# Patient Record
Sex: FEMALE | URBAN - METROPOLITAN AREA
[De-identification: names, ages, dates, MRNs, and addresses within clinical notes are randomized per-mention and may not be internally consistent; named-entity substitution may affect disease eponyms.]

---

## 2018-04-13 ENCOUNTER — APPOINTMENT (RX ONLY)
Dept: URBAN - METROPOLITAN AREA CLINIC 20 | Facility: CLINIC | Age: 53
Setting detail: DERMATOLOGY
End: 2018-04-13

## 2018-04-13 DIAGNOSIS — Z41.9 ENCOUNTER FOR PROCEDURE FOR PURPOSES OTHER THAN REMEDYING HEALTH STATE, UNSPECIFIED: ICD-10-CM

## 2018-04-13 PROCEDURE — ? SCITON BBL

## 2018-04-13 ASSESSMENT — LOCATION DETAILED DESCRIPTION DERM
LOCATION DETAILED: RIGHT CHIN
LOCATION DETAILED: SUBMENTAL CHIN

## 2018-04-13 ASSESSMENT — LOCATION ZONE DERM: LOCATION ZONE: FACE

## 2018-04-13 ASSESSMENT — LOCATION SIMPLE DESCRIPTION DERM
LOCATION SIMPLE: SUBMENTAL CHIN
LOCATION SIMPLE: CHIN

## 2018-04-13 NOTE — PROCEDURE: SCITON BBL
Spot Size: Finesse Adapter Size: 15 x 15 mm square
Pulse Duration: 20
Topical Anesthesia?: 23% lidocaine, 7% tetracaine
Detail Level: Simple
Cooling (In C): 15
Repetition Rate (Hz): 10
Passes: 1
Preprocedure Text: The treatment areas were thoroughly cleaned. Clear ultrasound gel was applied to the treatment area. The area was treated with no immediate stacking of pulses.
Post Procedure Text: The patient tolerated the procedure well. Post care was reviewed with the patient.
Spot Size: Finesse Adapter Size: 15 x 45 mm (No Finesse Adapter)
Price (Use Numbers Only, No Special Characters Or $): 150.00
Post-Care Instructions: I reviewed with the patient in detail post-care instructions. Patient should stay away from the sun and wear sun protection until treated areas are fully healed.
Fluence (J/Cm2): 25
Pulse Duration Units: milliseconds
Cooling (In C): 13
Cooling ?: Yes
Length Of Topical Anesthesia Application (Optional): 15 minutes
Anesthesia Volume In Cc: 0
Consent: Written consent obtained, risks reviewed including but not limited to crusting, scabbing, blistering, scarring, darker or lighter pigmentary change, bruising, and/or incomplete response.

## 2018-05-11 ENCOUNTER — APPOINTMENT (RX ONLY)
Dept: URBAN - METROPOLITAN AREA CLINIC 36 | Facility: CLINIC | Age: 53
Setting detail: DERMATOLOGY
End: 2018-05-11

## 2018-05-11 DIAGNOSIS — Z41.9 ENCOUNTER FOR PROCEDURE FOR PURPOSES OTHER THAN REMEDYING HEALTH STATE, UNSPECIFIED: ICD-10-CM

## 2018-05-11 PROCEDURE — ? SCITON BBL

## 2018-05-11 ASSESSMENT — LOCATION SIMPLE DESCRIPTION DERM
LOCATION SIMPLE: SUBMENTAL CHIN
LOCATION SIMPLE: CHIN

## 2018-05-11 ASSESSMENT — LOCATION DETAILED DESCRIPTION DERM
LOCATION DETAILED: RIGHT CHIN
LOCATION DETAILED: SUBMENTAL CHIN

## 2018-05-11 ASSESSMENT — LOCATION ZONE DERM: LOCATION ZONE: FACE

## 2018-05-11 NOTE — PROCEDURE: SCITON BBL
Detail Level: Simple
Consent: Written consent obtained, risks reviewed including but not limited to crusting, scabbing, blistering, scarring, darker or lighter pigmentary change, bruising, and/or incomplete response.
Repetition Rate (Hz): 1
Post-Care Instructions: I reviewed with the patient in detail post-care instructions. Patient should stay away from the sun and wear sun protection until treated areas are fully healed.
Repetition Rate (Hz): 10
Pulse Duration: 20
Spot Size: Finesse Adapter Size: 15 x 15 mm square
Length Of Topical Anesthesia Application (Optional): 15 minutes
Cooling (In C): 13
Topical Anesthesia?: 23% lidocaine, 7% tetracaine
Cooling (In C): 15
Fluence (J/Cm2): 25
Pulse Duration Units: milliseconds
Pulse Duration: 17
Post Procedure Text: The patient tolerated the procedure well. Post care was reviewed with the patient.
Price (Use Numbers Only, No Special Characters Or $): 150.00
Cooling ?: Yes
Preprocedure Text: The treatment areas were thoroughly cleaned. Clear ultrasound gel was applied to the treatment area. The area was treated with no immediate stacking of pulses.
Anesthesia Volume In Cc: 0
Spot Size: Finesse Adapter Size: 15 x 45 mm (No Finesse Adapter)

## 2018-06-22 ENCOUNTER — APPOINTMENT (RX ONLY)
Dept: URBAN - METROPOLITAN AREA CLINIC 20 | Facility: CLINIC | Age: 53
Setting detail: DERMATOLOGY
End: 2018-06-22

## 2018-06-22 DIAGNOSIS — Z41.9 ENCOUNTER FOR PROCEDURE FOR PURPOSES OTHER THAN REMEDYING HEALTH STATE, UNSPECIFIED: ICD-10-CM

## 2018-06-22 PROCEDURE — ? SCITON BBL

## 2018-06-22 ASSESSMENT — LOCATION SIMPLE DESCRIPTION DERM
LOCATION SIMPLE: CHIN
LOCATION SIMPLE: SUBMENTAL CHIN

## 2018-06-22 ASSESSMENT — LOCATION DETAILED DESCRIPTION DERM
LOCATION DETAILED: RIGHT CHIN
LOCATION DETAILED: SUBMENTAL CHIN

## 2018-06-22 ASSESSMENT — LOCATION ZONE DERM: LOCATION ZONE: FACE

## 2018-06-22 NOTE — PROCEDURE: SCITON BBL
Pulse Duration Units: milliseconds
Repetition Rate (Hz): 10
Cooling (In C): 15
Fluence (J/Cm2): 13
Pulse Duration: 17
Fluence (J/Cm2): 25
Detail Level: Simple
Length Of Topical Anesthesia Application (Optional): 15 minutes
Topical Anesthesia?: 23% lidocaine, 7% tetracaine
Price (Use Numbers Only, No Special Characters Or $): 150.00
Passes: 1
Pulse Duration: 20
Treatment Number: 3
Anesthesia Volume In Cc: 0
Spot Size: Finesse Adapter Size: 15 x 45 mm (No Finesse Adapter)
Consent: Written consent obtained, risks reviewed including but not limited to crusting, scabbing, blistering, scarring, darker or lighter pigmentary change, bruising, and/or incomplete response.
Spot Size: Finesse Adapter Size: 15 x 15 mm square
Post-Care Instructions: I reviewed with the patient in detail post-care instructions. Patient should stay away from the sun and wear sun protection until treated areas are fully healed.
Cooling ?: Yes
Preprocedure Text: The treatment areas were thoroughly cleaned. Clear ultrasound gel was applied to the treatment area. The area was treated with no immediate stacking of pulses.
Post Procedure Text: The patient tolerated the procedure well. Post care was reviewed with the patient.

## 2018-07-27 ENCOUNTER — APPOINTMENT (RX ONLY)
Dept: URBAN - METROPOLITAN AREA CLINIC 20 | Facility: CLINIC | Age: 53
Setting detail: DERMATOLOGY
End: 2018-07-27

## 2018-07-27 DIAGNOSIS — Z41.9 ENCOUNTER FOR PROCEDURE FOR PURPOSES OTHER THAN REMEDYING HEALTH STATE, UNSPECIFIED: ICD-10-CM

## 2018-07-27 PROCEDURE — ? SCITON BBL

## 2018-07-27 ASSESSMENT — LOCATION DETAILED DESCRIPTION DERM
LOCATION DETAILED: RIGHT CHIN
LOCATION DETAILED: SUBMENTAL CHIN

## 2018-07-27 ASSESSMENT — LOCATION SIMPLE DESCRIPTION DERM
LOCATION SIMPLE: CHIN
LOCATION SIMPLE: SUBMENTAL CHIN

## 2018-07-27 ASSESSMENT — LOCATION ZONE DERM: LOCATION ZONE: FACE

## 2022-07-27 ENCOUNTER — HOSPITAL ENCOUNTER (OUTPATIENT)
Dept: HEMATOLOGY ONCOLOGY | Facility: MEDICAL CENTER | Age: 57
End: 2022-07-27
Attending: STUDENT IN AN ORGANIZED HEALTH CARE EDUCATION/TRAINING PROGRAM
Payer: MEDICAID

## 2022-07-27 VITALS
HEART RATE: 91 BPM | OXYGEN SATURATION: 96 % | DIASTOLIC BLOOD PRESSURE: 76 MMHG | HEIGHT: 66 IN | RESPIRATION RATE: 14 BRPM | BODY MASS INDEX: 30.7 KG/M2 | WEIGHT: 191 LBS | TEMPERATURE: 97.3 F | SYSTOLIC BLOOD PRESSURE: 118 MMHG

## 2022-07-27 DIAGNOSIS — C43.62 MALIGNANT MELANOMA OF LEFT UPPER EXTREMITY INCLUDING SHOULDER (HCC): ICD-10-CM

## 2022-07-27 PROCEDURE — 99203 OFFICE O/P NEW LOW 30 MIN: CPT | Performed by: STUDENT IN AN ORGANIZED HEALTH CARE EDUCATION/TRAINING PROGRAM

## 2022-07-27 PROCEDURE — 99212 OFFICE O/P EST SF 10 MIN: CPT | Performed by: STUDENT IN AN ORGANIZED HEALTH CARE EDUCATION/TRAINING PROGRAM

## 2022-07-27 RX ORDER — LISINOPRIL 10 MG/1
10 TABLET ORAL DAILY
COMMUNITY
Start: 2022-05-10

## 2022-07-27 RX ORDER — HYDROCHLOROTHIAZIDE 25 MG/1
25 TABLET ORAL
COMMUNITY

## 2022-07-27 ASSESSMENT — ENCOUNTER SYMPTOMS
MYALGIAS: 0
WHEEZING: 0
CONSTIPATION: 0
NAUSEA: 0
BRUISES/BLEEDS EASILY: 0
COUGH: 0
BLOOD IN STOOL: 0
SPUTUM PRODUCTION: 0
BACK PAIN: 0
WEIGHT LOSS: 0
NERVOUS/ANXIOUS: 0
INSOMNIA: 0
FEVER: 0
DIARRHEA: 0
SHORTNESS OF BREATH: 0
CHILLS: 0
HEADACHES: 0
DIZZINESS: 0
BLURRED VISION: 0
DOUBLE VISION: 0
SORE THROAT: 0
HEARTBURN: 0
PALPITATIONS: 0
ABDOMINAL PAIN: 0
WEAKNESS: 0
DIAPHORESIS: 0
VOMITING: 0
TINGLING: 0
SINUS PAIN: 0
ORTHOPNEA: 0

## 2022-07-27 NOTE — PROGRESS NOTES
Consult:  Hematology/Oncology      Referring Physician: Bala Duff MD  Primary Care:  RONNIE Gibson    Diagnosis: Cutaneous melanoma    Chief Complaint:  Evaluation for systemic therapy, surveillance    History of Presenting Illness:  Rochelle Mccann is a 57 y.o.  woman who presents to the clinic for evaluation for a recent melanoma of the left upper extremity that was removed by Dr. Bala Duff. It had been there for some time and the patient had seen dermatology for evaluation. She also was noted to have spots on her back that were suspicious. She ultimately underwent surgical excision and then lymph node biopsy, and her disease was found to be a [id number] stage IIa melanoma. She has been referred to me for evaluation.    Interval History:  Patient is here for consultation. She states that she is doing well overall. She was unable to follow with dermatology due to insurance issues.     Past Medical History:   Diagnosis Date   • Arthritis    • Cancer (HCC)    • Skin melanoma (HCC)        Past Surgical History:   Procedure Laterality Date   • ABDOMINAL EXPLORATION  2019    Gastric sleeve surgery   • MOLE EXCISION Left     Melanoma L arm       Social History     Socioeconomic History   • Marital status: Single     Spouse name: Not on file   • Number of children: Not on file   • Years of education: Not on file   • Highest education level: Not on file   Occupational History   • Not on file   Tobacco Use   • Smoking status: Never Smoker   • Smokeless tobacco: Never Used   Vaping Use   • Vaping Use: Never used   Substance and Sexual Activity   • Alcohol use: Never   • Drug use: Never   • Sexual activity: Not on file   Other Topics Concern   • Not on file   Social History Narrative    She lives in [place]. Lives with her boyfriend. She stays at home currently. She had a lot of sun exposure as a kid, sunburns, etc.      Social Determinants of Health     Financial Resource Strain: Not on file   Food  "Insecurity: Not on file   Transportation Needs: Not on file   Physical Activity: Not on file   Stress: Not on file   Social Connections: Not on file   Intimate Partner Violence: Not on file   Housing Stability: Not on file       Family History   Problem Relation Age of Onset   • Cancer Maternal Grandfather         colon cancer       OB History   No obstetric history on file.       Allergies as of 07/27/2022   • (No Known Allergies)         Current Outpatient Medications:   •  lisinopril (PRINIVIL) 10 MG Tab, Take 10 mg by mouth every day., Disp: , Rfl:   •  hydroCHLOROthiazide (HYDRODIURIL) 25 MG Tab, Take 25 mg by mouth every day., Disp: , Rfl:     Review of Systems:  Review of Systems   Constitutional: Negative for chills, diaphoresis, fever, malaise/fatigue and weight loss.   HENT: Negative for hearing loss, nosebleeds, sinus pain and sore throat.    Eyes: Negative for blurred vision and double vision.   Respiratory: Negative for cough, sputum production, shortness of breath and wheezing.    Cardiovascular: Negative for chest pain, palpitations, orthopnea and leg swelling.   Gastrointestinal: Negative for abdominal pain, blood in stool, constipation, diarrhea, heartburn, melena, nausea and vomiting.   Genitourinary: Negative for dysuria, frequency, hematuria and urgency.   Musculoskeletal: Negative for back pain, joint pain and myalgias.   Skin: Negative for rash.   Neurological: Negative for dizziness, tingling, weakness and headaches.   Endo/Heme/Allergies: Does not bruise/bleed easily.   Psychiatric/Behavioral: The patient is not nervous/anxious and does not have insomnia.           Physical Exam:  Vitals:    07/27/22 1012   BP: 118/76   BP Location: Right arm   Patient Position: Sitting   BP Cuff Size: Adult   Pulse: 91   Resp: 14   Temp: 36.3 °C (97.3 °F)   TempSrc: Temporal   SpO2: 96%   Weight: 86.6 kg (191 lb)   Height: 1.676 m (5' 6\")       DESC; KARNOFSKY SCALE WITH ECOG EQUIVALENT: 100, Fully active, " able to carry on all pre-disease performed without restriction (ECOG equivalent 0)    DISTRESS LEVEL: no apparent distress    Physical Exam  Vitals and nursing note reviewed.   Constitutional:       General: She is awake. She is not in acute distress.     Appearance: Normal appearance. She is obese. She is not ill-appearing, toxic-appearing or diaphoretic.   HENT:      Head: Normocephalic and atraumatic.      Nose: Nose normal. No congestion.      Mouth/Throat:      Pharynx: Oropharynx is clear. No oropharyngeal exudate or posterior oropharyngeal erythema.   Eyes:      General: No scleral icterus.     Extraocular Movements: Extraocular movements intact.      Conjunctiva/sclera: Conjunctivae normal.      Pupils: Pupils are equal, round, and reactive to light.   Cardiovascular:      Rate and Rhythm: Normal rate and regular rhythm.      Pulses: Normal pulses.      Heart sounds: Normal heart sounds. No murmur heard.    No friction rub. No gallop.   Pulmonary:      Effort: Pulmonary effort is normal.      Breath sounds: Normal breath sounds. No decreased air movement. No wheezing, rhonchi or rales.   Chest:   Breasts:      Right: No axillary adenopathy.      Left: No axillary adenopathy.       Abdominal:      General: Bowel sounds are normal. There is no distension.      Tenderness: There is no abdominal tenderness.   Musculoskeletal:         General: No deformity. Normal range of motion.      Cervical back: Normal range of motion and neck supple. No tenderness.      Right lower leg: No edema.      Left lower leg: No edema.   Lymphadenopathy:      Cervical: No cervical adenopathy.      Upper Body:      Right upper body: No axillary adenopathy.      Left upper body: No axillary adenopathy.      Lower Body: No right inguinal adenopathy. No left inguinal adenopathy.   Skin:     General: Skin is warm and dry.      Coloration: Skin is not jaundiced.      Findings: Lesion (Post-op changes to upper L arm, with surgical scar.  Lesion on back on left side on the bra line, slightly raised, symmetric, no border irregularity, some color abnormality though) present. No erythema or rash.   Neurological:      General: No focal deficit present.      Mental Status: She is alert and oriented to person, place, and time.      Cranial Nerves: Cranial nerves are intact.      Sensory: Sensation is intact.      Motor: Motor function is intact. No weakness.      Gait: Gait is intact.   Psychiatric:         Attention and Perception: Attention normal.         Mood and Affect: Mood normal.         Behavior: Behavior normal. Behavior is cooperative.         Thought Content: Thought content normal.         Judgment: Judgment normal.          Depression Screening    Little interest or pleasure in doing things?      Feeling down, depressed , or hopeless?     Trouble falling or staying asleep, or sleeping too much?      Feeling tired or having little energy?      Poor appetite or overeating?      Feeling bad about yourself - or that you are a failure or have let yourself or your family down?     Trouble concentrating on things, such as reading the newspaper or watching television?     Moving or speaking so slowly that other people could have noticed.  Or the opposite - being so fidgety or restless that you have been moving around a lot more than usual?      Thoughts that you would be better off dead, or of hurting yourself?      Patient Health Questionnaire Score:         If depressive symptoms identified deferred to follow up visit unless specifically addressed in assesment and plan.    Interpretation of PHQ-9 Total Score   Score Severity   1-4 No Depression   5-9 Mild Depression   10-14 Moderate Depression   15-19 Moderately Severe Depression   20-27 Severe Depression    Labs:  No visits with results within 7 Day(s) from this visit.   Latest known visit with results is:   No results found for any previous visit.       Imaging:   All listed images below have  been independently reviewed by me. I agree with the findings as summarized below:    No results found.     Pathology:    Skin excision L arm 06/2022:        Assessment & Plan:  1. Malignant melanoma of left upper extremity including shoulder (HCC)  Referral to Dermatology     This is a 57 year old  woman with cutaneous melanoma, [id number] stage IIa disease, who presents for evaluation. She is s/p wide local excision with sentinel lymph node biopsy which was negative.     Current Diagnosis and Staging: Cutaneous melanoma, [id number] stage IIa disease    Treatment Plan: Surveillance    Treatment Citation: NCCN    Plan of Care:    1. Primary Therapy: NA  2. Supportive Therapy: NA  3. Labs: NA  4. Imaging: NA  5. Treatment Planning: Will need exam with dedicated skin exam q 6 months x 5 years, best if performed by dermatology. Referral placed to dermatology ([facility] in [place], patient reports they take her insurance). If she is able to get in with them in the next 6 months, then she will follow up with them exclusively; otherwise, we will continue to monitor her until she is fully established with a derm provider.   6. Consultations: Surgical oncology (Dr. Duff), dermatology  7. Code Status: Full  8. Miscellaneous: NA  9. Return for Follow Up: 6 months (if not established with derm by then)    Any questions and concerns raised by the patient were answered to the best of my ability. Thank you for allowing me to participate in the care for this patient. Please feel free to contact me for any questions or concerns.     Total time spent on chart review, clinic encounter, and documentation: 36 minutes.

## 2022-07-28 NOTE — ADDENDUM NOTE
Encounter addended by: Sabine Quevedo, Med Ass't on: 7/27/2022 5:00 PM   Actions taken: Charge Capture section accepted

## 2023-01-26 ENCOUNTER — TELEPHONE (OUTPATIENT)
Dept: HEMATOLOGY ONCOLOGY | Facility: MEDICAL CENTER | Age: 58
End: 2023-01-26
Payer: MEDICAID

## 2023-01-26 NOTE — TELEPHONE ENCOUNTER
Angelina calling to cancel tomorrow's appointment with Dr. Olivarez.    Patient got into the dermatologist, and is not sure if she needs this appointment or not.    Angelina states that the patient will call at a later date to reschedule, if needed.

## 2023-01-27 ENCOUNTER — APPOINTMENT (OUTPATIENT)
Dept: HEMATOLOGY ONCOLOGY | Facility: MEDICAL CENTER | Age: 58
End: 2023-01-27
Payer: MEDICAID

## 2023-02-14 PROBLEM — Z96.641 S/P TOTAL RIGHT HIP ARTHROPLASTY: Status: ACTIVE | Noted: 2023-02-14

## 2024-03-30 ENCOUNTER — OFFICE VISIT (OUTPATIENT)
Dept: URGENT CARE | Facility: CLINIC | Age: 59
End: 2024-03-30
Payer: MEDICAID

## 2024-03-30 VITALS
HEIGHT: 66 IN | RESPIRATION RATE: 14 BRPM | WEIGHT: 190 LBS | HEART RATE: 95 BPM | DIASTOLIC BLOOD PRESSURE: 84 MMHG | BODY MASS INDEX: 30.53 KG/M2 | OXYGEN SATURATION: 98 % | TEMPERATURE: 98.6 F | SYSTOLIC BLOOD PRESSURE: 128 MMHG

## 2024-03-30 DIAGNOSIS — L03.115 CELLULITIS OF RIGHT LOWER LEG: ICD-10-CM

## 2024-03-30 DIAGNOSIS — S81.801A OPEN WOUND OF RIGHT LOWER LEG, INITIAL ENCOUNTER: ICD-10-CM

## 2024-03-30 RX ORDER — CEPHALEXIN 500 MG/1
500 CAPSULE ORAL 4 TIMES DAILY
Qty: 28 CAPSULE | Refills: 0 | Status: SHIPPED | OUTPATIENT
Start: 2024-03-30 | End: 2024-04-06

## 2024-03-30 ASSESSMENT — ENCOUNTER SYMPTOMS
WEAKNESS: 0
FEVER: 0
NEUROLOGICAL NEGATIVE: 1
ROS SKIN COMMENTS: PER HPI
SENSORY CHANGE: 0
MUSCULOSKELETAL NEGATIVE: 1
CONSTITUTIONAL NEGATIVE: 1

## 2024-03-30 ASSESSMENT — FIBROSIS 4 INDEX: FIB4 SCORE: 0.85

## 2024-03-30 ASSESSMENT — VISUAL ACUITY: OU: 1

## 2024-03-30 NOTE — PROGRESS NOTES
Subjective:     Rochelle Mccann is a 59 y.o. female who presents for Other (Patient coming for a cut located on R leg x 1month)       Other  This is a new problem. The problem has been gradually worsening. Pertinent negatives include no fever or weakness.     1 month ago, patient was in her yard when she accidentally scraped the anterior aspect of her right lower leg against an object.  Suffered a skin tear.  Reports she has been performing basic wound care.  However, continues to have an open wound with surrounding redness, swelling, and mild tenderness.  Denies calf pain, red streaking, numbness, tingling, weakness, fever, or other symptoms.  Has been performing basic wound care with mild soap and water and applying topical antibiotic ointment and dressing.  Tdap received 2018.  Hx of varicose veins.    Review of Systems   Constitutional: Negative.  Negative for fever.   Cardiovascular:  Positive for leg swelling.   Musculoskeletal: Negative.    Skin:         Per HPI   Neurological: Negative.  Negative for sensory change and weakness.   All other systems reviewed and are negative.    Refer to HPI for additional details.    During this visit, appropriate PPE was worn, and hand hygiene was performed.    PMH:  has a past medical history of Acute pain, Arthritis, Cancer (HCC), and Skin melanoma (HCC).    MEDS:   Current Outpatient Medications:     cephALEXin (KEFLEX) 500 MG Cap, Take 1 Capsule by mouth 4 times a day for 7 days., Disp: 28 Capsule, Rfl: 0    ibuprofen (MOTRIN) 200 MG Tab, Take 400 mg by mouth every 8 hours as needed., Disp: , Rfl:     hydroCHLOROthiazide (HYDRODIURIL) 25 MG Tab, Take 25 mg by mouth 1 time a day as needed., Disp: , Rfl:     lisinopril (PRINIVIL) 10 MG Tab, Take 10 mg by mouth every day. Reports not taking every day (Patient not taking: Reported on 3/30/2024), Disp: , Rfl:     ALLERGIES: No Known Allergies  SURGHX:   Past Surgical History:   Procedure Laterality Date    CA TOTAL HIP  "ARTHROPLASTY Right 2/14/2023    Procedure: ANTERIOR RIGHT TOTAL HIP MAKOPLASTY;  Surgeon: Deshawn Rosenthal M.D.;  Location: SURGERY [place];  Service: Orthopedics    ABDOMINAL EXPLORATION  2019    Gastric sleeve surgery    MOLE EXCISION Left     Melanoma L arm     SOCHX:  reports that she has never smoked. She has never used smokeless tobacco. She reports that she does not drink alcohol and does not use drugs.    FH: Per HPI as applicable/pertinent.      Objective:     /84   Pulse 95   Temp 37 °C (98.6 °F) (Temporal)   Resp 14   Ht 1.676 m (5' 6\")   Wt 86.2 kg (190 lb)   SpO2 98%   BMI 30.67 kg/m²     Physical Exam  Nursing note reviewed.   Constitutional:       General: She is not in acute distress.     Appearance: She is well-developed. She is not ill-appearing or toxic-appearing.   Eyes:      General: Vision grossly intact.   Cardiovascular:      Rate and Rhythm: Normal rate.      Pulses: Normal pulses.   Pulmonary:      Effort: Pulmonary effort is normal. No respiratory distress.   Musculoskeletal:         General: No deformity. Normal range of motion.      Right lower leg: Swelling (Compared to left), laceration (Approx 1 x 0.5 cm open wound from previous skin tear injury at anterior right lower leg, granulation tissue present at wound bed, surrounding erythema, induration, warmth, TTP) and tenderness present. No deformity.      Left lower leg: No swelling.      Right foot: Normal capillary refill. Normal pulse.        Legs:       Comments: Negative right Homans' sign, no calf tenderness, no red streaking   Skin:     General: Skin is warm and dry.      Capillary Refill: Capillary refill takes less than 2 seconds.      Coloration: Skin is not pale.   Neurological:      Mental Status: She is alert and oriented to person, place, and time.      Motor: No weakness.   Psychiatric:         Behavior: Behavior normal. Behavior is cooperative.       Assessment/Plan:     1. Cellulitis of right lower leg  - " cephALEXin (KEFLEX) 500 MG Cap; Take 1 Capsule by mouth 4 times a day for 7 days.  Dispense: 28 Capsule; Refill: 0    2. Open wound of right lower leg, initial encounter    Rx as above sent electronically.     Advised basic wound care with mild soapy water.  Leave wound open to air whenever possible.  If the dressing is used to protect the injury, apply thin layer of antibiotic ointment.  Monitor symptoms.  Follow-up in 7 days if not resolved.  Warning signs reviewed.  Immediate return precautions advised.    Differential diagnosis, natural history, supportive care, over-the-counter symptom management per 's instructions, close monitoring, and indications for immediate follow-up discussed.     All questions answered. Patient agrees with the plan of care.    Discharge summary provided via [employer].

## 2024-04-11 ENCOUNTER — OFFICE VISIT (OUTPATIENT)
Dept: URGENT CARE | Facility: CLINIC | Age: 59
End: 2024-04-11
Payer: MEDICAID

## 2024-04-11 VITALS
TEMPERATURE: 97.6 F | OXYGEN SATURATION: 99 % | SYSTOLIC BLOOD PRESSURE: 112 MMHG | HEIGHT: 66 IN | WEIGHT: 190 LBS | RESPIRATION RATE: 14 BRPM | BODY MASS INDEX: 30.53 KG/M2 | DIASTOLIC BLOOD PRESSURE: 70 MMHG | HEART RATE: 82 BPM

## 2024-04-11 DIAGNOSIS — L08.9 WOUND INFECTION: ICD-10-CM

## 2024-04-11 DIAGNOSIS — T14.8XXA WOUND INFECTION: ICD-10-CM

## 2024-04-11 PROCEDURE — 99213 OFFICE O/P EST LOW 20 MIN: CPT | Performed by: NURSE PRACTITIONER

## 2024-04-11 PROCEDURE — 3074F SYST BP LT 130 MM HG: CPT | Performed by: NURSE PRACTITIONER

## 2024-04-11 PROCEDURE — 3078F DIAST BP <80 MM HG: CPT | Performed by: NURSE PRACTITIONER

## 2024-04-11 RX ORDER — DOXYCYCLINE HYCLATE 100 MG
100 TABLET ORAL 2 TIMES DAILY
Qty: 14 TABLET | Refills: 0 | Status: SHIPPED | OUTPATIENT
Start: 2024-04-11 | End: 2024-04-18

## 2024-04-11 ASSESSMENT — ENCOUNTER SYMPTOMS
RESPIRATORY NEGATIVE: 1
CHILLS: 0
CARDIOVASCULAR NEGATIVE: 1
PSYCHIATRIC NEGATIVE: 1
MUSCULOSKELETAL NEGATIVE: 1
NEUROLOGICAL NEGATIVE: 1
GASTROINTESTINAL NEGATIVE: 1
CONSTITUTIONAL NEGATIVE: 1
EYES NEGATIVE: 1
FEVER: 0

## 2024-04-11 ASSESSMENT — FIBROSIS 4 INDEX: FIB4 SCORE: 0.85

## 2024-04-11 NOTE — PROGRESS NOTES
"Subjective:   Rochelle Mccann is a 59 y.o. female who presents for Wound Infection (R leg, pt states was last seen 3/30, continues to have discharge )      Wound Infection  This is a new problem. Episode onset: Treated for RLE wound infection on 3/30 with abx - finished abx and continues with redness, pain, swelling and drainage. The problem occurs constantly. The problem has been gradually worsening. Pertinent negatives include no chills or fever. Nothing aggravates the symptoms. Treatments tried: Antibiotics, wound cleansing. The treatment provided mild relief.       Review of Systems   Constitutional: Negative.  Negative for chills and fever.   HENT: Negative.     Eyes: Negative.    Respiratory: Negative.     Cardiovascular: Negative.    Gastrointestinal: Negative.    Genitourinary: Negative.    Musculoskeletal: Negative.    Skin:         Wound on right lower extremity, scabbed over, with drainage, redness and swelling   Neurological: Negative.    Endo/Heme/Allergies: Negative.    Psychiatric/Behavioral: Negative.     All other systems reviewed and are negative.      Medications, Allergies, and current problem list reviewed today in Epic.     Objective:     /70 (BP Location: Left arm, Patient Position: Sitting, BP Cuff Size: Adult)   Pulse 82   Temp 36.4 °C (97.6 °F) (Temporal)   Resp 14   Ht 1.676 m (5' 6\")   Wt 86.2 kg (190 lb)   SpO2 99%     Physical Exam  Constitutional:       Appearance: Normal appearance. She is normal weight.   HENT:      Head: Normocephalic and atraumatic.      Right Ear: Tympanic membrane, ear canal and external ear normal.      Left Ear: Tympanic membrane, ear canal and external ear normal.      Nose: Nose normal.      Mouth/Throat:      Mouth: Mucous membranes are moist.      Pharynx: Oropharynx is clear.   Eyes:      Extraocular Movements: Extraocular movements intact.      Conjunctiva/sclera: Conjunctivae normal.      Pupils: Pupils are equal, round, and reactive to light. "   Cardiovascular:      Rate and Rhythm: Normal rate and regular rhythm.      Pulses: Normal pulses.   Pulmonary:      Effort: Pulmonary effort is normal.      Breath sounds: Normal breath sounds.   Musculoskeletal:      Cervical back: Normal range of motion and neck supple.   Skin:     General: Skin is warm and dry.      Capillary Refill: Capillary refill takes less than 2 seconds.             Comments: There is an approximately 2 cm scabbed, circular wound to the anterior right calf. There is surrounding erythema and edema and warmth. There is mild serosanguinous drainage to the area.  There is pain to palpation. No fluctuance.  No induration.    Neurological:      General: No focal deficit present.      Mental Status: She is alert.         Assessment/Plan:     Diagnosis and associated orders:     1. Wound infection  doxycycline (VIBRAMYCIN) 100 MG Tab         Comments/MDM:     Start antibiotics  and complete them.   Elevate the affected area above the heart if possible.  Return for redness spreading more than 1-2 inches, red streaks, ill appearance or formation of abscess.  Keep any open areas bandaged to prevent contagion.  See a doctor if not better 2-3 days.    Your caregiver has diagnosed you or your child as having cellulitis. Cellulitis is an infection of the skin and the tissue beneath it. The area is typically red and tender. It is caused by bacteria (germs) (usually staph or strep) that enter the body through cuts or sores. Cellulitis most commonly occurs in the arms and/or lower legs.      HOME CARE INSTRUCTIONS  If you are given a prescription for antibiotics (medications which kill germs), take as directed until finished.  If the infection is on the arm or leg, keep the limb elevated as able.  Use a heating pad several times per day to relieve pain and encourage healing. Do not sleep with heating pad. If you are diabetic, do not use a heating pad unless instructed to do so.  See your caregiver for a  recheck of the infected site in 2 days, or sooner if problems arise.  Use acetaminophen (Tylenol®) or ibuprofen (Advil® or Motrin®) as needed for relief of fever, pain and discomfort.    seek medical care if:  An oral temperature above 102° F (38.9° C) develops, or as your caregiver suggests, not controlled by medication.  The area of redness is spreading, there are red streaks coming from the infected site, or if a part of the infection begins to turn dark in color.  The joint or bone underneath the infected skin becomes painful after the skin has healed.  The infection returns in the same or another area after it seems to have gone away  A boil or bump swells up. This may be an abscess.  New, unexplained symptoms (problems) such as pain or fever develop.    seek immediate medical care if:   You or your child feel drowsy or lethargic.  There is vomiting, diarrhea, or generalized malaise with muscle aches and pains.           Differential diagnosis, natural history, supportive care, and indications for immediate follow-up discussed.    Advised the patient to follow-up with the primary care physician for recheck, reevaluation, and consideration of further management.    Please note that this dictation was created using voice recognition software. I have made a reasonable attempt to correct obvious errors, but I expect that there are errors of grammar and possibly content that I did not discover before finalizing the note.    This note was electronically signed by VANESSA Gates

## 2024-04-23 ENCOUNTER — OFFICE VISIT (OUTPATIENT)
Dept: URGENT CARE | Facility: CLINIC | Age: 59
End: 2024-04-23
Payer: MEDICAID

## 2024-04-23 ENCOUNTER — HOSPITAL ENCOUNTER (OUTPATIENT)
Facility: MEDICAL CENTER | Age: 59
End: 2024-04-23
Payer: MEDICAID

## 2024-04-23 VITALS
DIASTOLIC BLOOD PRESSURE: 76 MMHG | WEIGHT: 190 LBS | SYSTOLIC BLOOD PRESSURE: 118 MMHG | RESPIRATION RATE: 16 BRPM | OXYGEN SATURATION: 96 % | BODY MASS INDEX: 30.53 KG/M2 | TEMPERATURE: 97.3 F | HEART RATE: 89 BPM | HEIGHT: 66 IN

## 2024-04-23 DIAGNOSIS — S81.801D OPEN WOUND OF RIGHT LOWER LEG, SUBSEQUENT ENCOUNTER: ICD-10-CM

## 2024-04-23 DIAGNOSIS — Z85.820 PERSONAL HISTORY OF MALIGNANT MELANOMA OF SKIN: ICD-10-CM

## 2024-04-23 DIAGNOSIS — L03.115 CELLULITIS OF RIGHT LOWER EXTREMITY: ICD-10-CM

## 2024-04-23 DIAGNOSIS — L03.115 CELLULITIS OF RIGHT LOWER EXTREMITY: Primary | ICD-10-CM

## 2024-04-23 PROCEDURE — 3074F SYST BP LT 130 MM HG: CPT

## 2024-04-23 PROCEDURE — 87077 CULTURE AEROBIC IDENTIFY: CPT

## 2024-04-23 PROCEDURE — 87205 SMEAR GRAM STAIN: CPT

## 2024-04-23 PROCEDURE — 99213 OFFICE O/P EST LOW 20 MIN: CPT | Mod: 25

## 2024-04-23 PROCEDURE — 97597 DBRDMT OPN WND 1ST 20 CM/<: CPT

## 2024-04-23 PROCEDURE — 3078F DIAST BP <80 MM HG: CPT

## 2024-04-23 PROCEDURE — 87070 CULTURE OTHR SPECIMN AEROBIC: CPT

## 2024-04-23 RX ORDER — DOXYCYCLINE HYCLATE 100 MG
100 TABLET ORAL 2 TIMES DAILY
Qty: 14 TABLET | Refills: 0 | Status: SHIPPED | OUTPATIENT
Start: 2024-04-23 | End: 2024-04-30

## 2024-04-23 ASSESSMENT — ENCOUNTER SYMPTOMS
SORE THROAT: 0
COUGH: 0
NAUSEA: 0
ABDOMINAL PAIN: 0
FEVER: 0
CHILLS: 0
EYE PAIN: 0
DIARRHEA: 0
SPUTUM PRODUCTION: 0
PALPITATIONS: 0
DIZZINESS: 0
WHEEZING: 0
MYALGIAS: 0
SHORTNESS OF BREATH: 0
STRIDOR: 0
EYE DISCHARGE: 0
HEADACHES: 0
EYE REDNESS: 0
VOMITING: 0

## 2024-04-23 ASSESSMENT — FIBROSIS 4 INDEX: FIB4 SCORE: 0.85

## 2024-04-23 NOTE — PROGRESS NOTES
Subjective:   Rochelle Mccann is a 59 y.o. female who presents for Other (Patient coming for possible right leg infection )          I introduced myself to the patient and informed them that I am a family nurse practitioner.    HPI:Rochelle is a 59-year-old female who comes in today c/o ongoing problem with wound and infection of right lower extremity.  She states the wound started toward the end of February when she thinks she bumped her leg against something while working in the yard, noticed a small abrasion at that time.  She managed it for a month or so with antibiotic ointment and Band-Aids, however it was not healing.  She was seen in urgent care on 3/30/2024, placed on cephalexin at this time, instructed regarding basic wound care.  Most recently she returned to urgent care on 4/11/2024 with cellulitis, was started on doxycycline which she states she did finish and this did seem to get rid of the redness and swelling of her leg for a few days, however she states yesterday it started becoming red and swollen again..  She states the wound is mostly dry and has a scab, has occasional drainage.  Patient describes symptoms as constant. They describe the pain as sore to touch around the wound. Aggravating factors include touching or bumping the area. Relieving factors include none. Treatments tried at home include did complete antibiotics as stated above, has been leaving the wound area YAHIR. They describe their symptoms as moderate.  Significantly she does have a history of malignant melanoma of her left upper extremity and shoulder a couple of years ago.      Review of Systems   Constitutional:  Negative for chills, fever and malaise/fatigue.   HENT:  Negative for congestion, ear pain and sore throat.    Eyes:  Negative for pain, discharge and redness.   Respiratory:  Negative for cough, sputum production, shortness of breath, wheezing and stridor.    Cardiovascular:  Negative for chest pain and palpitations.  "  Gastrointestinal:  Negative for abdominal pain, diarrhea, nausea and vomiting.   Genitourinary:  Negative for dysuria.   Musculoskeletal:  Negative for myalgias.   Skin:  Negative for rash.        Positive for wound and cellulitis to right anterior lower extremity   Neurological:  Negative for dizziness and headaches.       Medications: hydroCHLOROthiazide Tabs  ibuprofen Tabs  lisinopril Tabs     Allergies: Patient has no known allergies.    Problem List: does not have any pertinent problems on file.    Surgical History:  Past Surgical History:   Procedure Laterality Date    [place] TOTAL HIP ARTHROPLASTY Right 2/14/2023    Procedure: ANTERIOR RIGHT TOTAL HIP MAKOPLASTY;  Surgeon: Deshawn Rosenthal M.D.;  Location: SURGERY [facility];  Service: Orthopedics    ABDOMINAL EXPLORATION  2019    Gastric sleeve surgery    MOLE EXCISION Left     Melanoma L arm       Past Social Hx:   reports that she has never smoked. She has never used smokeless tobacco. She reports that she does not drink alcohol and does not use drugs.     Past Family Hx:   family history includes Cancer in her maternal grandfather.     Problem list, medications, and allergies reviewed by myself today in Epic.   I have documented what I find to be significant in regards to past medical, social, family and surgical history  in my HPI or under PMH/PSH/FH review section, otherwise it is noncontributory     Objective:     /76   Pulse 89   Temp 36.3 °C (97.3 °F) (Temporal)   Resp 16   Ht 1.676 m (5' 6\")   Wt 86.2 kg (190 lb)   SpO2 96%   BMI 30.67 kg/m²     During this visit, appropriate PPE was worn, and hand hygiene was performed.    Physical Exam  Vitals reviewed.   Constitutional:       General: She is not in acute distress.     Appearance: Normal appearance. She is normal weight. She is not ill-appearing or toxic-appearing.   HENT:      Head: Normocephalic and atraumatic.      Right Ear: External ear normal.      Left Ear: External ear normal.      " Nose: No congestion or rhinorrhea.   Eyes:      General: No scleral icterus.        Right eye: No discharge.         Left eye: No discharge.      Extraocular Movements: Extraocular movements intact.      Conjunctiva/sclera: Conjunctivae normal.   Cardiovascular:      Rate and Rhythm: Normal rate.   Pulmonary:      Effort: Pulmonary effort is normal.   Abdominal:      General: There is no distension.   Genitourinary:     Comments: Deferred  Musculoskeletal:         General: No swelling or tenderness. Normal range of motion.      Right lower leg: No edema.      Left lower leg: No edema.   Skin:     General: Skin is warm and dry.      Findings: Erythema present.      Comments: There is a dry, scabbed, boggy area with surrounding callus tissue on right anterior lower extremity measuring 0.7 x 1.2 cm, with an area of surrounding erythema 8x8 cm.  Area is mildly warm to touch, slightly indurated, painful to palpate.  There is some mild nonpitting edema of the right lower extremity.  No lymphangitis noted, NVID with cap refill to distal digits less than 3 seconds, sensation intact to hard and soft, pedal pulses PT/DP intact at 2+.  Normal gait   Neurological:      General: No focal deficit present.      Mental Status: She is alert and oriented to person, place, and time. Mental status is at baseline.   Psychiatric:         Mood and Affect: Mood normal.         Behavior: Behavior normal.                   Procedure-wound debridement  As scab is moist and boggy, suspicious for an underlying wound, I did take a tweezers and perform conservative sharp wound debridement of the nonviable tissue revealing a an open wound beneath with 100% red viable tissue measuring 0.7 x 1.5 x 0.3 cm.  Wound was irrigated with sterile normal saline, surrounding skin was cleansed with saline and gauze, dried with gauze, Xeroform and Telfa dressing was applied by MA.  Patient tolerated procedure well    Assessment/Plan:     Diagnosis and  associated orders:     1. Cellulitis of right lower extremity  doxycycline (VIBRAMYCIN) 100 MG Tab    Referral to Wound Clinic    CULTURE WOUND W/ GRAM STAIN      2. Open wound of right lower leg, subsequent encounter  doxycycline (VIBRAMYCIN) 100 MG Tab    Referral to Wound Clinic    CULTURE WOUND W/ GRAM STAIN      3. Personal history of malignant melanoma of skin           Comments/MDM:     1. Cellulitis of right lower extremity  - doxycycline (VIBRAMYCIN) 100 MG Tab; Take 1 Tablet by mouth 2 times a day for 7 days.  Dispense: 14 Tablet; Refill: 0  - Referral to Wound Clinic  - CULTURE WOUND W/ GRAM STAIN; Future    2. Open wound of right lower leg, subsequent encounter  Discussed with patient Dx,  DDx including the possibility of skin cancer, management options (risks,benefits, and alternatives to planned treatment), natural progression.   Supportive care measures were discussed.   Recommended referral to wound care for evaluation.  Patient states she is not keen on doing this, does not want to drive to [place] for wound care.  I did advise her we could refer her to [facility] wound care in [place].  She states she does not want to do this at this time, would like to try wound care and antibiotics for another week or so despite my counseling/recommendation to get this started ASAP.  I did discuss with her debridement of the nonviable tissue from the wound, this is likely causing nonresolution of the wound as well as recurrent wound infection/cellulitis.  She is agreeable to this.  I did tell her I would place the referral to wound care as they may be booked out for a week or 2, if she does decide to do this if wound care and antibiotics fail over the next week or so, the referral would be made already.  She states she understands and is agreeable  Conservative sharp wound debridement performed as per procedure note above  Discussed with patient daily cleansing with sterile normal saline and wound care with  Xeroform, DSD.  MA did apply dressing today and instruct patient in wound care technique.  Patient advised to return to urgent care in about a week for assessment of wound healing, return sooner if she gets any worsening symptoms.  She states she understands and is agreeable  Questions were encouraged and answered.   Written information was provided and I did go over this with the patient in clinic today.  Instructed patient regarding red flags and to return to urgent care prn if new or worsening sx or if there is no improvement in condition prn.    Advised the patient to follow-up with the primary care physician for recheck, reevaluation, and consideration of further management.  I did instruct patient regarding medications prescribed, purpose, side effects, precautions.  Instructed patient to get a pharmacy consult when picking up any prescribed medications.  Strict ER precautions discussed for any increased redness, swelling, pain of her right lower extremity especially in the setting of fever, chills, nausea or vomiting, lethargy   Patient states they have good understanding and they are agreeable with the plan of care.     - doxycycline (VIBRAMYCIN) 100 MG Tab; Take 1 Tablet by mouth 2 times a day for 7 days.  Dispense: 14 Tablet; Refill: 0  - Referral to Wound Clinic  - CULTURE WOUND W/ GRAM STAIN; Future    3. Personal history of malignant melanoma of skin  Recommended to patient referring to wound care, wound may need biopsy if it continues to be nonhealing.  Advised her to follow-up with dermatologist.  She states she understands         Pt is clinically stable at today's acute urgent care visit. Vital signs are normal and reassuring.  No acute distress noted. Appropriate for outpatient management at this time.        I personally reviewed prior external notes and test results pertinent to today's visit.  I have independently reviewed and interpreted all diagnostics ordered during this urgent care acute  visit.        Please note that this dictation was created using voice recognition software. I have made a reasonable attempt to correct obvious errors, but I expect that there are errors of grammar and possibly content that I did not discover before finalizing the note.    This note was electronically signed by Sherwin MENDEZ, ACACIA, COTY, JEROD

## 2024-04-24 LAB
GRAM STN SPEC: NORMAL
SIGNIFICANT IND 70042: NORMAL
SITE SITE: NORMAL
SOURCE SOURCE: NORMAL

## 2024-04-26 LAB
BACTERIA WND AEROBE CULT: NORMAL
GRAM STN SPEC: NORMAL
SIGNIFICANT IND 70042: NORMAL
SITE SITE: NORMAL
SOURCE SOURCE: NORMAL

## 2024-04-30 ENCOUNTER — TELEPHONE (OUTPATIENT)
Dept: URGENT CARE | Facility: CLINIC | Age: 59
End: 2024-04-30
Payer: MEDICAID

## 2024-04-30 NOTE — TELEPHONE ENCOUNTER
Telephone call to patient following MA message that she had called.  I discussed with patient that she should continue the full course of the doxycycline, she should only have about a day left at this point, as we spoke about in clinic the culture was more to guide the antibiotic therapy should it culture a bacteria that needed a change of the antibiotics.  I asked her if she had made an appointment with wound care or was able to get an appointment with her dermatologist, she states she has not called dermatology yet and she has not heard from anybody concerning getting evaluated at wound care.  I encouraged her again today to be proactive and call, again give her the [facility] scheduling #[phone], and encouraged her to call her dermatologist with whom she is already established, ask if she can be seen due to a suspicious nonhealing wound on her right leg which is concerning due to her past history of melanoma.  She states she understands and will do so.

## 2024-04-30 NOTE — TELEPHONE ENCOUNTER
Pt called in regards to culture results from 4/23/24. Pt notified of negative test result. Pt was wondering if she should discontinue medication. Please advise.

## 2024-12-30 ENCOUNTER — HOSPITAL ENCOUNTER (EMERGENCY)
Facility: MEDICAL CENTER | Age: 59
End: 2024-12-30
Payer: MEDICAID

## 2024-12-30 VITALS
RESPIRATION RATE: 16 BRPM | SYSTOLIC BLOOD PRESSURE: 144 MMHG | WEIGHT: 184.3 LBS | OXYGEN SATURATION: 94 % | BODY MASS INDEX: 29.62 KG/M2 | HEART RATE: 102 BPM | TEMPERATURE: 97.1 F | HEIGHT: 66 IN | DIASTOLIC BLOOD PRESSURE: 99 MMHG

## 2024-12-30 PROCEDURE — 302449 STATCHG TRIAGE ONLY (STATISTIC)

## 2024-12-30 ASSESSMENT — FIBROSIS 4 INDEX: FIB4 SCORE: 0.85

## 2024-12-31 NOTE — ED NOTES
Patient provided with specimen container and instructed on clean-catch technique  Ambulated to restroom to attempt to provide urine sample.

## 2024-12-31 NOTE — ED TRIAGE NOTES
"Chief Complaint   Patient presents with    Abdominal Pain     RLQ pain since yesterday     BP (!) 144/99   Pulse (!) 102   Temp 36.2 °C (97.1 °F) (Temporal)   Resp 16   Ht 1.676 m (5' 6\")   Wt 83.6 kg (184 lb 4.9 oz)   SpO2 94%   BMI 29.75 kg/m²     Patient presents with complaints of RLQ abdominal pain since yesterday. Denies any fever or chills. No nausea, vomiting or diarrhea. Still has her appendix. Currently states that she has no pain but the pain is exacerbated by bending over or lifting anything. Has a personal history of ovarian cysts but states that this pain is different.  "

## 2025-05-20 ENCOUNTER — OFFICE VISIT (OUTPATIENT)
Dept: URGENT CARE | Facility: CLINIC | Age: 60
End: 2025-05-20
Payer: MEDICAID

## 2025-05-20 VITALS
DIASTOLIC BLOOD PRESSURE: 78 MMHG | HEART RATE: 89 BPM | HEIGHT: 66 IN | SYSTOLIC BLOOD PRESSURE: 126 MMHG | RESPIRATION RATE: 18 BRPM | BODY MASS INDEX: 29.89 KG/M2 | OXYGEN SATURATION: 96 % | WEIGHT: 186 LBS | TEMPERATURE: 97.8 F

## 2025-05-20 DIAGNOSIS — L03.115 CELLULITIS OF RIGHT LOWER LEG: ICD-10-CM

## 2025-05-20 DIAGNOSIS — M79.661 RIGHT CALF PAIN: ICD-10-CM

## 2025-05-20 DIAGNOSIS — M79.89 RIGHT LEG SWELLING: Primary | ICD-10-CM

## 2025-05-20 PROCEDURE — 3074F SYST BP LT 130 MM HG: CPT | Performed by: NURSE PRACTITIONER

## 2025-05-20 PROCEDURE — 3078F DIAST BP <80 MM HG: CPT | Performed by: NURSE PRACTITIONER

## 2025-05-20 PROCEDURE — 99213 OFFICE O/P EST LOW 20 MIN: CPT | Performed by: NURSE PRACTITIONER

## 2025-05-20 RX ORDER — CEPHALEXIN 500 MG/1
500 CAPSULE ORAL 4 TIMES DAILY
Qty: 28 CAPSULE | Refills: 0 | Status: SHIPPED | OUTPATIENT
Start: 2025-05-20 | End: 2025-05-27

## 2025-05-20 ASSESSMENT — ENCOUNTER SYMPTOMS: LEG PAIN: 1

## 2025-05-20 NOTE — PATIENT INSTRUCTIONS
-Cold compresses.  -Elevate extremity for swelling.   -Nonsteroidal anti-inflammatory drugs (NSAIDs) for pain, Ibuprofen as directed.  -Watch for any signs of increased infection (redness, pus, pain, increased swelling or fever).     Follow up for increased signs of infection, red streaking, or any other concerns. Follow up emergently for severe uncontrolled pain, neurovascular compromise (decreased sensation, motion, or circulation), or shortness of breath.

## 2025-05-20 NOTE — PROGRESS NOTES
Subjective:     Rochelle Mccann is a 60 y.o. female who presents for Insect Bite (Right leg x 2 days)      Presents for redness, pan, and swelling to her lower right leg. Unsure if she was bit by something, as she was pulling weeds Sunday. Denies itching. States she was also possibly poked by foxtails. States starting yesterday she felt a knot in medial calf area. Redness started today. No reported pain at rest. Aleve.         Leg Pain  This is a new problem. The current episode started yesterday. The problem has been rapidly worsening. Pertinent negatives include no fever. She has tried NSAIDs for the symptoms.       Past Medical History[1]    Past Surgical History[2]    Social History     Socioeconomic History    Marital status: Single     Spouse name: Not on file    Number of children: Not on file    Years of education: Not on file    Highest education level: Not on file   Occupational History    Not on file   Tobacco Use    Smoking status: Never    Smokeless tobacco: Never   Vaping Use    Vaping status: Never Used   Substance and Sexual Activity    Alcohol use: Never    Drug use: Never    Sexual activity: Not on file   Other Topics Concern    Not on file   Social History Narrative    She lives in [place]. Lives with her boyfriend. She stays at home currently. She had a lot of sun exposure as a kid, sunburns, etc.      Social Drivers of Health     Financial Resource Strain: Not on File (4/29/2024)    Received from [facility]    Financial Resource Strain     Financial Resource Strain: 0   Food Insecurity: Not on File (9/26/2024)    Received from [facility]    Food Insecurity     Food: 0   Transportation Needs: Not on File (4/29/2024)    Received from [facility]    Transportation Needs     Transportation: 0   Physical Activity: Not on File (4/29/2024)    Received from [facility]    Physical Activity     Physical Activity: 0   Stress: Not on File (4/29/2024)    Received from [facility]    Stress     Stress: 0   Social Connections: Not on File  "(2024)    Received from [facility]    Social Connections     Connectedness: 0   Intimate Partner Violence: Low Risk  (2023)    Received from [facility]    History of Abuse     History of Abuse: Denies   Housing Stability: Not on File (2024)    Received from [facility]    Housing Stability     Housin        Family History   Problem Relation Age of Onset    Cancer Maternal Grandfather         colon cancer        Allergies[3]    Review of Systems   Constitutional:  Negative for fever.   Respiratory:  Negative for shortness of breath.    Cardiovascular:  Positive for leg swelling.   Skin:  Negative for itching.   All other systems reviewed and are negative.       Objective:   /78   Pulse 89   Temp 36.6 °C (97.8 °F) (Temporal)   Resp 18   Ht 1.676 m (5' 6\")   Wt 84.4 kg (186 lb)   SpO2 96%   BMI 30.02 kg/m²     Physical Exam  Vitals reviewed.   Constitutional:       Appearance: She is not toxic-appearing.   Cardiovascular:      Rate and Rhythm: Normal rate.   Pulmonary:      Effort: Pulmonary effort is normal. No respiratory distress.   Musculoskeletal:      Left lower leg: Swelling and tenderness present. No bony tenderness. Edema present.      Left foot: Normal capillary refill. No swelling. Normal pulse.        Legs:    Skin:     General: Skin is warm and dry.      Findings: Erythema present. No wound.      Comments: Tenderness to proximal medial right calf. Lower extremity swelling and erythema. No skin breakdown.    Neurological:      Mental Status: She is alert and oriented to person, place, and time.         Assessment/Plan:   1. Cellulitis of right lower leg  - cephALEXin (KEFLEX) 500 MG Cap; Take 1 Capsule by mouth 4 times a day for 7 days.  Dispense: 28 Capsule; Refill: 0  - cefTRIAXone (Rocephin) 500 mg in lidocaine (Xylocaine) 1 % 2 mL for IM use    2. Right leg swelling  - US-EXTREMITY VENOUS LOWER UNILAT RIGHT; Future    3. Right calf pain  - US-EXTREMITY VENOUS LOWER " UNILAT RIGHT; Future  US-EXTREMITY VENOUS LOWER UNILAT RIGHT  Result Date: 5/21/2025 5/21/2025 12:49 PM HISTORY/REASON FOR EXAM:  Pain in Limb with Pressure Right lower extremity swelling TECHNIQUE/EXAM DESCRIPTION: Using both color and pulsed-wave Doppler imaging, multiple images were obtained of the right lower extremity from the common femoral vein origin distally through the popliteal trifurcation.  Graded compression was used to demonstrate a patent lumen. COMPARISON:  None. FINDINGS: REAL-TIME GRAY-SCALE IMAGING: Real-time gray-scale imaging reveals no evidence of focal wall thickening. COLOR AND DUPLEX DOPPLER IMAGING: There is no evidence of luminal thrombus.  There is normal augmentation to flow and respiratory variation.     No evidence of right lower extremity deep venous thrombosis.    -Cold compresses.  -Elevate extremity for swelling.   -Nonsteroidal anti-inflammatory drugs (NSAIDs) for pain, Ibuprofen as directed.  -Watch for any signs of increased infection (redness, pus, pain, increased swelling or fever).     Follow up for increased signs of infection, red streaking, or any other concerns. Follow up emergently for severe uncontrolled pain, neurovascular compromise (decreased sensation, motion, or circulation), or shortness of breath.    Presents with acute lower extremity pain, swelling, and redness. No signs of skin breakdown noted on exam. Patient has a history of cellulitis to her left lower leg, 9/23. Differential to include possible DVT. Discussed imaging, risks, and benefits. No DVT noted on imaging.     Differential diagnosis, natural history, supportive care, and indications for immediate follow-up discussed.         [1]   Past Medical History:  Diagnosis Date    Acute pain     Arthritis     Cancer (HCC)     Skin melanoma (HCC)     surgery 6/2022 Left Arm   [2]   Past Surgical History:  Procedure Laterality Date    [place] TOTAL HIP ARTHROPLASTY Right 2/14/2023    Procedure: ANTERIOR RIGHT  TOTAL HIP MAKOPLASTY;  Surgeon: Deshawn Rosenthal M.D.;  Location: SURGERY [place];  Service: Orthopedics    ABDOMINAL EXPLORATION  2019    Gastric sleeve surgery    MOLE EXCISION Left     Melanoma L arm   [3] No Known Allergies

## 2025-05-21 ENCOUNTER — APPOINTMENT (OUTPATIENT)
Dept: RADIOLOGY | Facility: MEDICAL CENTER | Age: 60
End: 2025-05-21
Attending: NURSE PRACTITIONER
Payer: MEDICAID

## 2025-05-21 DIAGNOSIS — M79.89 RIGHT LEG SWELLING: ICD-10-CM

## 2025-05-21 PROCEDURE — 93971 EXTREMITY STUDY: CPT | Mod: RT

## 2025-05-22 ENCOUNTER — RESULTS FOLLOW-UP (OUTPATIENT)
Dept: URGENT CARE | Facility: CLINIC | Age: 60
End: 2025-05-22

## 2025-05-23 ASSESSMENT — ENCOUNTER SYMPTOMS
SHORTNESS OF BREATH: 0
FEVER: 0

## 2025-08-22 ENCOUNTER — OFFICE VISIT (OUTPATIENT)
Dept: URGENT CARE | Facility: CLINIC | Age: 60
End: 2025-08-22
Payer: MEDICAID

## 2025-08-22 VITALS
TEMPERATURE: 97.4 F | DIASTOLIC BLOOD PRESSURE: 80 MMHG | OXYGEN SATURATION: 99 % | BODY MASS INDEX: 27.63 KG/M2 | HEIGHT: 66 IN | RESPIRATION RATE: 14 BRPM | HEART RATE: 88 BPM | WEIGHT: 171.9 LBS | SYSTOLIC BLOOD PRESSURE: 126 MMHG

## 2025-08-22 DIAGNOSIS — L03.116 CELLULITIS OF LEFT LOWER EXTREMITY: Primary | ICD-10-CM

## 2025-08-22 PROCEDURE — 3079F DIAST BP 80-89 MM HG: CPT | Performed by: PHYSICIAN ASSISTANT

## 2025-08-22 PROCEDURE — 99213 OFFICE O/P EST LOW 20 MIN: CPT | Performed by: PHYSICIAN ASSISTANT

## 2025-08-22 PROCEDURE — 3074F SYST BP LT 130 MM HG: CPT | Performed by: PHYSICIAN ASSISTANT

## 2025-08-22 RX ORDER — LISINOPRIL AND HYDROCHLOROTHIAZIDE 10; 12.5 MG/1; MG/1
1 TABLET ORAL DAILY
COMMUNITY
Start: 2025-07-24

## 2025-08-22 RX ORDER — TIRZEPATIDE 5 MG/.5ML
5 INJECTION, SOLUTION SUBCUTANEOUS
COMMUNITY

## 2025-08-22 RX ORDER — CEPHALEXIN 500 MG/1
500 CAPSULE ORAL 4 TIMES DAILY
Qty: 28 CAPSULE | Refills: 0 | Status: SHIPPED | OUTPATIENT
Start: 2025-08-22 | End: 2025-08-29

## 2025-08-22 RX ORDER — LISINOPRIL 10 MG/1
1 TABLET ORAL DAILY
COMMUNITY
End: 2025-08-22

## 2025-08-23 ASSESSMENT — ENCOUNTER SYMPTOMS
FEVER: 0
SENSORY CHANGE: 0
CHILLS: 0
FOCAL WEAKNESS: 0
LEG PAIN: 1
TINGLING: 0
JOINT SWELLING: 0
VOMITING: 0
NAUSEA: 0